# Patient Record
Sex: MALE | Race: WHITE | URBAN - METROPOLITAN AREA
[De-identification: names, ages, dates, MRNs, and addresses within clinical notes are randomized per-mention and may not be internally consistent; named-entity substitution may affect disease eponyms.]

---

## 2023-03-06 DIAGNOSIS — K21.9 GERD WITHOUT ESOPHAGITIS: Primary | ICD-10-CM

## 2023-03-08 RX ORDER — OMEPRAZOLE 40 MG/1
1 CAPSULE, DELAYED RELEASE ORAL DAILY
COMMUNITY
Start: 2019-11-11 | End: 2023-03-08 | Stop reason: SDUPTHER

## 2023-03-08 RX ORDER — OMEPRAZOLE 40 MG/1
40 CAPSULE, DELAYED RELEASE ORAL DAILY
Qty: 90 CAPSULE | Refills: 0 | Status: SHIPPED | OUTPATIENT
Start: 2023-03-08 | End: 2023-05-24

## 2023-03-08 NOTE — TELEPHONE ENCOUNTER
Requested Prescriptions     Pending Prescriptions Disp Refills    omeprazole (PriLOSEC) 40 mg DR capsule 30 capsule 0     Sig: Take 1 capsule (40 mg) by mouth once daily.

## 2023-03-22 DIAGNOSIS — S33.9XXA SPRAIN OF LIGAMENT OF LUMBOSACRAL JOINT, INITIAL ENCOUNTER: Primary | ICD-10-CM

## 2023-03-22 RX ORDER — CYCLOBENZAPRINE HCL 10 MG
10 TABLET ORAL 3 TIMES DAILY PRN
COMMUNITY
Start: 2013-03-15 | End: 2023-03-22 | Stop reason: SDUPTHER

## 2023-03-22 RX ORDER — CYCLOBENZAPRINE HCL 10 MG
10 TABLET ORAL 3 TIMES DAILY PRN
Qty: 90 TABLET | Refills: 1 | Status: SHIPPED | OUTPATIENT
Start: 2023-03-22 | End: 2023-05-21

## 2023-03-22 NOTE — TELEPHONE ENCOUNTER
Requested Prescriptions     Pending Prescriptions Disp Refills    cyclobenzaprine (Flexeril) 10 mg tablet       Sig: Take 1 tablet (10 mg) by mouth 3 times a day as needed for muscle spasms.

## 2023-05-02 ENCOUNTER — OFFICE VISIT (OUTPATIENT)
Dept: PRIMARY CARE | Facility: CLINIC | Age: 53
End: 2023-05-02
Payer: COMMERCIAL

## 2023-05-02 ENCOUNTER — LAB (OUTPATIENT)
Dept: LAB | Facility: LAB | Age: 53
End: 2023-05-02
Payer: COMMERCIAL

## 2023-05-02 VITALS
HEART RATE: 63 BPM | BODY MASS INDEX: 28 KG/M2 | SYSTOLIC BLOOD PRESSURE: 148 MMHG | WEIGHT: 224 LBS | TEMPERATURE: 98.4 F | RESPIRATION RATE: 18 BRPM | OXYGEN SATURATION: 97 % | DIASTOLIC BLOOD PRESSURE: 91 MMHG

## 2023-05-02 DIAGNOSIS — N40.0 BENIGN PROSTATIC HYPERPLASIA, UNSPECIFIED WHETHER LOWER URINARY TRACT SYMPTOMS PRESENT: ICD-10-CM

## 2023-05-02 DIAGNOSIS — E53.8 VITAMIN B12 DEFICIENCY: ICD-10-CM

## 2023-05-02 DIAGNOSIS — E55.9 VITAMIN D DEFICIENCY: ICD-10-CM

## 2023-05-02 DIAGNOSIS — Z00.00 ENCOUNTER FOR HEALTH MAINTENANCE EXAMINATION: ICD-10-CM

## 2023-05-02 DIAGNOSIS — K58.9 IRRITABLE BOWEL SYNDROME, UNSPECIFIED TYPE: ICD-10-CM

## 2023-05-02 DIAGNOSIS — G89.4 CHRONIC PAIN SYNDROME: ICD-10-CM

## 2023-05-02 DIAGNOSIS — F41.1 GENERALIZED ANXIETY DISORDER: ICD-10-CM

## 2023-05-02 DIAGNOSIS — I10 HYPERTENSION, UNSPECIFIED TYPE: ICD-10-CM

## 2023-05-02 DIAGNOSIS — K21.9 GASTROESOPHAGEAL REFLUX DISEASE WITHOUT ESOPHAGITIS: ICD-10-CM

## 2023-05-02 DIAGNOSIS — Z00.00 ENCOUNTER FOR HEALTH MAINTENANCE EXAMINATION: Primary | ICD-10-CM

## 2023-05-02 DIAGNOSIS — M51.37 DISC DEGENERATION, LUMBOSACRAL: ICD-10-CM

## 2023-05-02 DIAGNOSIS — E78.5 HYPERLIPIDEMIA, UNSPECIFIED HYPERLIPIDEMIA TYPE: ICD-10-CM

## 2023-05-02 DIAGNOSIS — K64.9 HEMORRHOIDS, UNSPECIFIED HEMORRHOID TYPE: ICD-10-CM

## 2023-05-02 PROBLEM — M54.9 BACK PAIN WITH RADIATION: Status: ACTIVE | Noted: 2023-05-02

## 2023-05-02 PROBLEM — J30.9 ALLERGIC RHINITIS: Status: ACTIVE | Noted: 2023-05-02

## 2023-05-02 PROBLEM — G89.29 CHRONIC PAIN: Status: ACTIVE | Noted: 2021-01-06

## 2023-05-02 PROBLEM — M51.379 DISC DEGENERATION, LUMBOSACRAL: Status: ACTIVE | Noted: 2023-05-02

## 2023-05-02 LAB
ALANINE AMINOTRANSFERASE (SGPT) (U/L) IN SER/PLAS: 26 U/L (ref 10–52)
ALBUMIN (G/DL) IN SER/PLAS: 4.9 G/DL (ref 3.4–5)
ALKALINE PHOSPHATASE (U/L) IN SER/PLAS: 75 U/L (ref 33–120)
ANION GAP IN SER/PLAS: 16 MMOL/L (ref 10–20)
ASPARTATE AMINOTRANSFERASE (SGOT) (U/L) IN SER/PLAS: 24 U/L (ref 9–39)
BILIRUBIN TOTAL (MG/DL) IN SER/PLAS: 0.8 MG/DL (ref 0–1.2)
CALCIDIOL (25 OH VITAMIN D3) (NG/ML) IN SER/PLAS: 19 NG/ML
CALCIUM (MG/DL) IN SER/PLAS: 9.9 MG/DL (ref 8.6–10.3)
CARBON DIOXIDE, TOTAL (MMOL/L) IN SER/PLAS: 27 MMOL/L (ref 21–32)
CHLORIDE (MMOL/L) IN SER/PLAS: 102 MMOL/L (ref 98–107)
CHOLESTEROL (MG/DL) IN SER/PLAS: 192 MG/DL (ref 0–199)
CHOLESTEROL IN HDL (MG/DL) IN SER/PLAS: 50.3 MG/DL
CHOLESTEROL/HDL RATIO: 3.8
COBALAMIN (VITAMIN B12) (PG/ML) IN SER/PLAS: 236 PG/ML (ref 211–911)
CREATININE (MG/DL) IN SER/PLAS: 0.93 MG/DL (ref 0.5–1.3)
ERYTHROCYTE DISTRIBUTION WIDTH (RATIO) BY AUTOMATED COUNT: 12.7 % (ref 11.5–14.5)
ERYTHROCYTE MEAN CORPUSCULAR HEMOGLOBIN CONCENTRATION (G/DL) BY AUTOMATED: 34.4 G/DL (ref 32–36)
ERYTHROCYTE MEAN CORPUSCULAR VOLUME (FL) BY AUTOMATED COUNT: 86 FL (ref 80–100)
ERYTHROCYTES (10*6/UL) IN BLOOD BY AUTOMATED COUNT: 5.17 X10E12/L (ref 4.5–5.9)
ESTIMATED AVERAGE GLUCOSE FOR HBA1C: 120 MG/DL
GFR MALE: >90 ML/MIN/1.73M2
GLUCOSE (MG/DL) IN SER/PLAS: 118 MG/DL (ref 74–99)
HEMATOCRIT (%) IN BLOOD BY AUTOMATED COUNT: 44.5 % (ref 41–52)
HEMOGLOBIN (G/DL) IN BLOOD: 15.3 G/DL (ref 13.5–17.5)
HEMOGLOBIN A1C/HEMOGLOBIN TOTAL IN BLOOD: 5.8 %
LDL: 90 MG/DL (ref 0–99)
LEUKOCYTES (10*3/UL) IN BLOOD BY AUTOMATED COUNT: 5.1 X10E9/L (ref 4.4–11.3)
NON HDL CHOLESTEROL: 142 MG/DL
PLATELETS (10*3/UL) IN BLOOD AUTOMATED COUNT: 201 X10E9/L (ref 150–450)
POTASSIUM (MMOL/L) IN SER/PLAS: 4.6 MMOL/L (ref 3.5–5.3)
PROSTATE SPECIFIC AG (NG/ML) IN SER/PLAS: 0.15 NG/ML (ref 0–4)
PROTEIN TOTAL: 7.5 G/DL (ref 6.4–8.2)
SODIUM (MMOL/L) IN SER/PLAS: 140 MMOL/L (ref 136–145)
THYROTROPIN (MIU/L) IN SER/PLAS BY DETECTION LIMIT <= 0.05 MIU/L: 0.92 MIU/L (ref 0.44–3.98)
TRIGLYCERIDE (MG/DL) IN SER/PLAS: 259 MG/DL (ref 0–149)
UREA NITROGEN (MG/DL) IN SER/PLAS: 12 MG/DL (ref 6–23)
VLDL: 52 MG/DL (ref 0–40)

## 2023-05-02 PROCEDURE — 84443 ASSAY THYROID STIM HORMONE: CPT

## 2023-05-02 PROCEDURE — 80061 LIPID PANEL: CPT

## 2023-05-02 PROCEDURE — 36415 COLL VENOUS BLD VENIPUNCTURE: CPT

## 2023-05-02 PROCEDURE — 85027 COMPLETE CBC AUTOMATED: CPT

## 2023-05-02 PROCEDURE — 82306 VITAMIN D 25 HYDROXY: CPT

## 2023-05-02 PROCEDURE — 82607 VITAMIN B-12: CPT

## 2023-05-02 PROCEDURE — 99396 PREV VISIT EST AGE 40-64: CPT | Performed by: FAMILY MEDICINE

## 2023-05-02 PROCEDURE — 80053 COMPREHEN METABOLIC PANEL: CPT

## 2023-05-02 PROCEDURE — 83036 HEMOGLOBIN GLYCOSYLATED A1C: CPT

## 2023-05-02 PROCEDURE — 84153 ASSAY OF PSA TOTAL: CPT

## 2023-05-02 RX ORDER — METOPROLOL TARTRATE 25 MG/1
TABLET, FILM COATED ORAL
COMMUNITY
Start: 2018-07-09 | End: 2023-05-02 | Stop reason: DRUGHIGH

## 2023-05-02 RX ORDER — ROSUVASTATIN CALCIUM 10 MG/1
TABLET, COATED ORAL
COMMUNITY
Start: 2017-03-17

## 2023-05-02 RX ORDER — TRAMADOL HYDROCHLORIDE 100 MG/1
TABLET, COATED ORAL
COMMUNITY

## 2023-05-02 RX ORDER — IBUPROFEN 800 MG/1
TABLET ORAL
COMMUNITY
Start: 2020-05-29

## 2023-05-02 RX ORDER — METOPROLOL TARTRATE 25 MG/1
12.5 TABLET, FILM COATED ORAL DAILY PRN
Qty: 30 TABLET | Refills: 0 | Status: SHIPPED | OUTPATIENT
Start: 2023-05-02

## 2023-05-02 RX ORDER — METOPROLOL SUCCINATE 50 MG/1
TABLET, EXTENDED RELEASE ORAL
COMMUNITY
Start: 2020-12-30

## 2023-05-02 ASSESSMENT — PATIENT HEALTH QUESTIONNAIRE - PHQ9
1. LITTLE INTEREST OR PLEASURE IN DOING THINGS: NOT AT ALL
SUM OF ALL RESPONSES TO PHQ9 QUESTIONS 1 AND 2: 0
2. FEELING DOWN, DEPRESSED OR HOPELESS: NOT AT ALL

## 2023-05-02 ASSESSMENT — ENCOUNTER SYMPTOMS
DEPRESSION: 0
LOSS OF SENSATION IN FEET: 0
OCCASIONAL FEELINGS OF UNSTEADINESS: 0

## 2023-05-02 NOTE — PROGRESS NOTES
"Subjective   Patient ID: Jesus Jackson is a 52 y.o. male who presents for Request For Order(s) (Lipid panel, is fasting today. Medical records request for insurance ).    HPI   Patient comes in today for complete physical exam.  He is finishing up allergy shots with his allergist and he believes its is helping.  He is not sure what they will be doing for maintenance.    His back pain right now is chronic and averages 5 out of 10 pain scale.  He did have an RF ablation about 17 months ago which is still effective.  We will continue to monitor.    His cardiac arrhythmia is under control with use of metoprolol tartrate half a tablet daily as needed per Dr. Hanks his cardiologist.        5/26/2022  Patient comes in today to discuss his allergies. He states he has never had them this bad. His eyes are almost swollen shut. He has been trying Zyrtec without relief. He has an appointment with an allergist but not until the end of June.    3/15/2022  Patient presents today for 6-month checkup and refill of meds. He states that he is taking his medicines as directed and is not having any side effects from them. He is currently here not only for 6-month checkup but also because he is having problems with an irritated throat. He states that he irritated throat has been going on actually for several months but his daughter Pushpa was just home for spring break and was diagnosed with mono.    6/10/2021  Patient presents today for 6-month checkup and refill of meds. He currently is without complaints. He states that he is taking his medicines as directed and is not having any side effects from them.    12/8/2020  Patient comes in today for physical exam. He is currently without complaints. He is taking his medicines as directed and is tolerating them well.    11/10/2020  Patient comes in today for follow-up on the bilateral \"neck lumps\". Patient has a fullness at the base of the left side of his neck which is similar on " the right and is nontender, noninflamed and otherwise does not bother the patient. He is concerned because when he has a shirt off and looks in the mirror there seems to be a difference in the 2 sides. Patient is highly anxious.    10/7/2020  Patient comes in today for evaluation of upper respiratory symptoms. He and his family just recently moved to an old farmhouse in [place] and not only are his wife and daughter with him but also his mother-in-law. The house is apparently over 100 years old and and needs work but he is looking forward to it. He has noticed he is clearing his throat a lot and has some phlegm but denies shortness of breath. He also had a basal cell carcinoma removed from his posterior right neck by Dr. Nicolas on 9/22/2020 and that his stitches out on 10/1/2020. The wound appears to be healing without sign of infection currently. He has however noticed some puffiness of the base of his neck bilaterally. He mentioned this to Dr. Nicolas who told him that is not uncommon after neck surgery and it should dissipate. Patient suffers from anxiety and this has him worried.    5/29/2020  Patient presents today for 3 different issues.   First of all he is complaining of allergy type symptoms with signage drainage and postnasal drip. He denies cough, fever or shortness of breath. He denies any exposure to coronavirus. He states he typically gets allergies this time of year.    #2 patient states he was exercising with dumbbells back in December and noticed pain in the left deltoid region. He continues to have problems today. He has problems with abduction and external rotation especially causing pain.    #3 patient has a fleshy non-pigmented mole on the posterior right neck that is aggravated by clothing and he would like to have it removed.    2/20/2020  Patient comes in today for follow-up on his IBS. He is very happy because it is gone away. He said it took about 3 or 4 days on the align to make it happen but  now it is doing well and he is down to having no more than 2 bowel movements a day. He is no longer having the abdominal pain and bloating.    Patient also experiencing some itching and irritation to the medial corner of the left eye. This has been occurring over the last few days. He does have a history of corneal problems with the left eye. He denies any trauma or injury to the eye. He denies any new chemicals or irritants that may have gotten into the eye. I don't see any obvious conjunctivitis but because of his corneal issues with the eye and ongoing regular eye treatments I have recommended that he follow-up with his ophthalmologist to see what their diagnosis is.    1/30/2020  Jesus is here for stomach issues, pain,bloating, frequent trips to the bathroom. certain foods bother his stomach. He has a real urge to have a bowel movement after eating. A lot of this pain is on the right side of his abdomen. His nausea is random. He did have a colonoscopy 11/14/17 by Dr. Russo which showed a normal colon and terminal ileum with external and internal hemorrhoids. He just had a physical last month which still show the hemorrhoids but was negative for blood.    12/5/19  The patient's health since the last visit is described as good. There are no interval changes in the patient's social and family history. he has regular dental visits. he complains of vision problems. Vision care includes wearing glasses. he denies hearing loss. Immunizations status: up to date.   Lifestyle: he consumes a diverse and healthy diet. he does not have any weight concerns. he does not exercise regularly. he does not use tobacco. he denies alcohol use.   Reproductive health: the patient is sexually active. birth control is not being practiced. He denies erectile dysfunction.   Metabolic screening: lipid profile performed within the past five years.   Patient comes in today for well physical exam. He currently is without complaints. His  "daughter was just accepted into [employer] where she hopes to study forensic medicine.    5/25/2019  The patient is a 48 year old male who presents with a complaint of lump(s). The lump(s) has been occurring for 2 weeks. The location of the lump(s) is the neck (right side). Note for \"Lump(s)\": Patient comes in today for evaluation of a skin lesion on the posterior right base of his neck. He has a history of skin cancers in his family and wanted to have this checked out.    Additional reasons for visit:    Cold symptoms is described as the following:  Symptoms include runny nose, nasal congestion, sore throat, headache, cough and sneezing. The episodes last for 4 weeks. The patient is not currently being treated for this problem. Note for \"Cold symptoms\": Patient also comes in today complaining of symptoms as noted above. He states he's been having the cold symptoms since Easter. In further questioning he does feel like he may have spring allergies. He does notice that his symptoms have been worse since he has seen the \"yellow-green dust\" on his car in the morning.     Review of Systems   All other systems reviewed and are negative.      Objective   BP (!) 148/91   Pulse 63   Temp 36.9 °C (98.4 °F)   Resp 18   Wt 102 kg (224 lb)   SpO2 97%   BMI 28.00 kg/m²     Physical Exam  Vitals reviewed.   Constitutional:       Appearance: He is well-developed.   HENT:      Head: Normocephalic and atraumatic.      Right Ear: Tympanic membrane, ear canal and external ear normal.      Left Ear: Tympanic membrane, ear canal and external ear normal.      Nose: Nose normal.      Mouth/Throat:      Mouth: Mucous membranes are moist.      Pharynx: Oropharynx is clear.   Eyes:      Extraocular Movements: Extraocular movements intact.      Conjunctiva/sclera: Conjunctivae normal.      Pupils: Pupils are equal, round, and reactive to light.   Cardiovascular:      Rate and Rhythm: Normal rate and regular rhythm.      Heart " sounds: Normal heart sounds. No murmur heard.  Pulmonary:      Effort: Pulmonary effort is normal.      Breath sounds: Normal breath sounds. No wheezing.   Abdominal:      General: Abdomen is flat. Bowel sounds are normal.      Palpations: Abdomen is soft.   Genitourinary:     Comments: External hemorrhoids present  Musculoskeletal:         General: Tenderness (Chronic low back pain) present. Normal range of motion.   Skin:     General: Skin is warm and dry.   Neurological:      General: No focal deficit present.      Mental Status: He is alert and oriented to person, place, and time. Mental status is at baseline.   Psychiatric:         Mood and Affect: Mood normal.         Behavior: Behavior normal.         Thought Content: Thought content normal.         Judgment: Judgment normal.         Assessment/Plan   Problem List Items Addressed This Visit       Chronic pain    Disc degeneration, lumbosacral    Gastroesophageal reflux disease    Generalized anxiety disorder    Hyperlipidemia    Relevant Orders    Lipid Panel    Hypertension    Irritable bowel syndrome    Vitamin D deficiency    Relevant Orders    Vitamin D, Total    Vitamin B12 deficiency    Relevant Orders    CBC    Vitamin B12    Hemorrhoids    Encounter for health maintenance examination - Primary    Relevant Orders    Comprehensive Metabolic Panel    TSH with reflex to Free T4 if abnormal    Hemoglobin A1C     Other Visit Diagnoses       Benign prostatic hyperplasia, unspecified whether lower urinary tract symptoms present        Relevant Orders    Prostate Specific Antigen        Will contact patient with lab results when available.  Continue follow-up with allergy, pain management and cardiology as needed.  Continue current meds as directed.  Follow up in 6 months for recheck if all remains stable, sooner if problems arise.  Medication list reconciled.  Thank you for visiting today!      Professional services: Some of this note was completed using  Dragon voice technology and sometimes the software misinterprets words. This may include unintended errors with respect to translation of words, typographical errors or grammar errors which may not have been identified prior to finalization of the chart note. Please take this into account when reading the note. Thank you.

## 2023-05-03 ENCOUNTER — TELEPHONE (OUTPATIENT)
Dept: PEDIATRICS | Facility: CLINIC | Age: 53
End: 2023-05-03
Payer: COMMERCIAL

## 2023-05-03 NOTE — TELEPHONE ENCOUNTER
----- Message from Hadley Burgess DO sent at 5/3/2023  7:25 AM EDT -----  Regarding: Labs  Please notify patient of low vitamin D of 19.  It should be between 30 and 100 the closer to 50 the better. It is an important vitamin for your heart, lungs, immune system and bones among other things in your body. Would recommend OTC vitamin D3 2000 units daily to get it into and keep it in the normal range and recheck in September 2023.      Vitamin B12 was also low at 236.  It should be above 400, when less than 400 one may have both neurological and/or psychological symptoms. Would recommend B-12 shots monthly for 4 months and recheck in September 2023. These can be self injected at home or set up in our office as a nurse visit.    All the rest of the labs look good.  Continue current medicines and recheck in 1 year.  ----- Message -----  From: Lab, Background User  Sent: 5/2/2023  12:01 PM EDT  To: Hadley Burgess DO

## 2023-05-22 DIAGNOSIS — K21.9 GERD WITHOUT ESOPHAGITIS: ICD-10-CM

## 2023-05-24 RX ORDER — OMEPRAZOLE 40 MG/1
CAPSULE, DELAYED RELEASE ORAL
Qty: 90 CAPSULE | Refills: 1 | Status: SHIPPED | OUTPATIENT
Start: 2023-05-24 | End: 2023-11-20

## 2023-10-12 ENCOUNTER — OFFICE VISIT (OUTPATIENT)
Dept: PRIMARY CARE | Facility: CLINIC | Age: 53
End: 2023-10-12
Payer: COMMERCIAL

## 2023-10-12 VITALS
SYSTOLIC BLOOD PRESSURE: 168 MMHG | WEIGHT: 221 LBS | RESPIRATION RATE: 18 BRPM | OXYGEN SATURATION: 98 % | HEART RATE: 53 BPM | BODY MASS INDEX: 27.62 KG/M2 | TEMPERATURE: 97.7 F | DIASTOLIC BLOOD PRESSURE: 102 MMHG

## 2023-10-12 DIAGNOSIS — F41.1 GENERALIZED ANXIETY DISORDER: ICD-10-CM

## 2023-10-12 DIAGNOSIS — I10 HYPERTENSION, UNSPECIFIED TYPE: Primary | ICD-10-CM

## 2023-10-12 DIAGNOSIS — I47.20 VENTRICULAR TACHYCARDIA (MULTI): ICD-10-CM

## 2023-10-12 PROBLEM — K44.9 HIATAL HERNIA: Status: ACTIVE | Noted: 2021-01-06

## 2023-10-12 PROBLEM — K27.9 PEPTIC ULCER: Status: ACTIVE | Noted: 2021-01-06

## 2023-10-12 PROBLEM — H81.10 VERTIGO, BENIGN POSITIONAL: Status: ACTIVE | Noted: 2023-10-12

## 2023-10-12 PROCEDURE — 99213 OFFICE O/P EST LOW 20 MIN: CPT | Performed by: NURSE PRACTITIONER

## 2023-10-12 RX ORDER — AMLODIPINE BESYLATE 5 MG/1
5 TABLET ORAL DAILY
Qty: 30 TABLET | Refills: 1 | Status: SHIPPED | OUTPATIENT
Start: 2023-10-12 | End: 2024-10-11

## 2023-10-12 ASSESSMENT — ENCOUNTER SYMPTOMS
HYPERTENSION: 1
COUGH: 0
HEADACHES: 1
PALPITATIONS: 1
NECK PAIN: 1
SHORTNESS OF BREATH: 0

## 2023-10-12 NOTE — PROGRESS NOTES
Subjective   Jesus Jackson is a 53 y.o. male who presents for Hypertension (Has been checking at home. Normal around 130s/70-80s, never been this high- 170/100 yesterday. Mild headaches. Sees cardiology ).  Hypertension  This is a new problem. The current episode started more than 1 year ago. The problem has been rapidly worsening since onset. The problem is resistant. Associated symptoms include anxiety, headaches, neck pain and palpitations. Pertinent negatives include no chest pain or shortness of breath. Agents associated with hypertension include NSAIDs. Risk factors for coronary artery disease include dyslipidemia and stress.   Review of Systems   Respiratory:  Negative for cough and shortness of breath.    Cardiovascular:  Positive for palpitations. Negative for chest pain.   Musculoskeletal:  Positive for neck pain.   Neurological:  Positive for headaches.   Objective   Physical Exam  Vitals reviewed.   Constitutional:       Appearance: Normal appearance.   HENT:      Head: Normocephalic.   Eyes:      Conjunctiva/sclera: Conjunctivae normal.   Cardiovascular:      Rate and Rhythm: Normal rate and regular rhythm.      Pulses: Normal pulses.      Heart sounds: Normal heart sounds. No murmur heard.  Pulmonary:      Effort: Pulmonary effort is normal.      Breath sounds: Normal breath sounds.   Abdominal:      General: Bowel sounds are normal.      Palpations: Abdomen is soft.   Musculoskeletal:         General: Normal range of motion.      Cervical back: Neck supple.   Skin:     General: Skin is warm and dry.   Neurological:      General: No focal deficit present.      Mental Status: He is alert and oriented to person, place, and time.   Psychiatric:         Mood and Affect: Mood normal.         Thought Content: Thought content normal.     BP (!) 168/102   Pulse 53   Temp 36.5 °C (97.7 °F)   Resp 18   Wt 100 kg (221 lb)   SpO2 98%   BMI 27.62 kg/m²   Assessment/Plan   Problem List Items Addressed This  Visit       Generalized anxiety disorder    Hypertension - Primary    Relevant Medications    amLODIPine (Norvasc) 5 mg tablet    Ventricular tachycardia (CMS/HCC)     Follows with Dr. Sparrow. Pt. States he has occasional PVCs, but every few years has runs of VT. He does not have an AICD, but was instructed by his cardiologist to use low-dose metoprolol tartrate as needed when symptoms begin.         Relevant Medications    amLODIPine (Norvasc) 5 mg tablet     Follow up with cardiology on Monday as scheduled.  If you develop chest pain, visual disturbance, difficulty speaking, or unilateral weakness, go to ER.

## 2023-10-12 NOTE — ASSESSMENT & PLAN NOTE
Follows with Dr. Sparrow. Pt. States he has occasional PVCs, but every few years has runs of VT. He does not have an AICD, but was instructed by his cardiologist to use low-dose metoprolol tartrate as needed when symptoms begin.

## 2023-10-17 ENCOUNTER — TELEPHONE (OUTPATIENT)
Dept: PRIMARY CARE | Facility: CLINIC | Age: 53
End: 2023-10-17

## 2023-10-17 ENCOUNTER — CLINICAL SUPPORT (OUTPATIENT)
Dept: PRIMARY CARE | Facility: CLINIC | Age: 53
End: 2023-10-17
Payer: COMMERCIAL

## 2023-10-17 DIAGNOSIS — E53.8 VITAMIN B12 DEFICIENCY: ICD-10-CM

## 2023-10-17 PROCEDURE — 96372 THER/PROPH/DIAG INJ SC/IM: CPT | Performed by: FAMILY MEDICINE

## 2023-10-17 RX ORDER — CYANOCOBALAMIN 1000 UG/ML
1000 INJECTION, SOLUTION INTRAMUSCULAR; SUBCUTANEOUS ONCE
Status: COMPLETED | OUTPATIENT
Start: 2023-10-17 | End: 2023-10-17

## 2023-10-17 RX ADMIN — CYANOCOBALAMIN 1000 MCG: 1000 INJECTION, SOLUTION INTRAMUSCULAR; SUBCUTANEOUS at 14:30

## 2023-10-17 NOTE — TELEPHONE ENCOUNTER
That should be fine to have them done on the same day. I don't know about the same site. That's a question that the pharmacist could answer better.

## 2023-10-17 NOTE — TELEPHONE ENCOUNTER
Pt lives in [place] and comes up once a month for allergy shots. He wants to know if he can do his B12 the same day or if its an issues having it injected around the same site the same day. Wants to make sure there is no interactions.

## 2023-11-14 ENCOUNTER — CLINICAL SUPPORT (OUTPATIENT)
Dept: PRIMARY CARE | Facility: CLINIC | Age: 53
End: 2023-11-14
Payer: COMMERCIAL

## 2023-11-14 DIAGNOSIS — E53.8 VITAMIN B12 DEFICIENCY: ICD-10-CM

## 2023-11-14 PROCEDURE — 96372 THER/PROPH/DIAG INJ SC/IM: CPT | Performed by: FAMILY MEDICINE

## 2023-11-14 RX ORDER — CYANOCOBALAMIN 1000 UG/ML
1000 INJECTION, SOLUTION INTRAMUSCULAR; SUBCUTANEOUS
Status: COMPLETED | OUTPATIENT
Start: 2023-11-14 | End: 2024-01-09

## 2023-11-14 RX ADMIN — CYANOCOBALAMIN 1000 MCG: 1000 INJECTION, SOLUTION INTRAMUSCULAR; SUBCUTANEOUS at 13:50

## 2023-11-14 NOTE — PROGRESS NOTES
Jesus Jackson presented in office for 2/4 b12  nurse visit. Pt tolerated well, no side effects. Overseeing provider was Dr. Dela Cruz.

## 2023-11-20 DIAGNOSIS — K21.9 GERD WITHOUT ESOPHAGITIS: ICD-10-CM

## 2023-11-20 RX ORDER — OMEPRAZOLE 40 MG/1
40 CAPSULE, DELAYED RELEASE ORAL DAILY
Qty: 90 CAPSULE | Refills: 1 | Status: SHIPPED | OUTPATIENT
Start: 2023-11-20 | End: 2023-12-05 | Stop reason: SDUPTHER

## 2023-11-20 NOTE — TELEPHONE ENCOUNTER
Requested Prescriptions     Pending Prescriptions Disp Refills    omeprazole (PriLOSEC) 40 mg DR capsule [Pharmacy Med Name: OMEPRAZOLE DR CAPS 40MG] 90 capsule 1     Sig: Take 1 capsule (40 mg) by mouth once daily.

## 2023-12-05 ENCOUNTER — TELEPHONE (OUTPATIENT)
Dept: PRIMARY CARE | Facility: CLINIC | Age: 53
End: 2023-12-05
Payer: COMMERCIAL

## 2023-12-05 DIAGNOSIS — K21.9 GERD WITHOUT ESOPHAGITIS: ICD-10-CM

## 2023-12-05 DIAGNOSIS — M51.37 DISC DEGENERATION, LUMBOSACRAL: ICD-10-CM

## 2023-12-05 DIAGNOSIS — M54.9 BACK PAIN WITH RADIATION: ICD-10-CM

## 2023-12-05 RX ORDER — OMEPRAZOLE 40 MG/1
40 CAPSULE, DELAYED RELEASE ORAL DAILY
Qty: 90 CAPSULE | Refills: 1 | Status: CANCELLED | OUTPATIENT
Start: 2023-12-05 | End: 2024-06-02

## 2023-12-05 RX ORDER — OMEPRAZOLE 40 MG/1
40 CAPSULE, DELAYED RELEASE ORAL DAILY
Qty: 90 CAPSULE | Refills: 1 | Status: SHIPPED | OUTPATIENT
Start: 2023-12-05 | End: 2024-06-02

## 2023-12-05 RX ORDER — TRAMADOL HYDROCHLORIDE 100 MG/1
100 TABLET, COATED ORAL DAILY
Qty: 60 TABLET | Refills: 0 | Status: CANCELLED | OUTPATIENT
Start: 2023-12-05

## 2023-12-05 RX ORDER — OMEPRAZOLE 40 MG/1
40 CAPSULE, DELAYED RELEASE ORAL DAILY
Qty: 15 CAPSULE | Refills: 0 | Status: SHIPPED | OUTPATIENT
Start: 2023-12-05 | End: 2024-06-07 | Stop reason: ALTCHOICE

## 2023-12-05 NOTE — TELEPHONE ENCOUNTER
Notify patient that the Prilosec was sent to the pharmacy.  Tramadol was not sent as it is a narcotic medication which I have never prescribed for him.

## 2023-12-05 NOTE — TELEPHONE ENCOUNTER
Spoke with patient. Informed him that Prilosec has been sent, and that Dr. Burgess could not send in Tramadol without seeing the patient. Patient stated he forgot which DrNatasha Prescribed the medication and will contact the prescribing DrNatasha

## 2023-12-05 NOTE — TELEPHONE ENCOUNTER
Patient is requesting a refill on his       Omeprazole 40 mg (Sent to [facility] as well as [facility] he will not have enough to last till gets his medication from [email or web address].)    Tramadol 100 mg (Sent to [facility] only)      Thank You       Patient last seen 10/12/23

## 2023-12-05 NOTE — TELEPHONE ENCOUNTER
Patient is asking if the Omeprazole can be sent to [facility] and [facility], as patient will not receive [facility] order in time.       Patient requests prescription below    Last Office Visit: 10/12/2023     Requested Prescriptions     Pending Prescriptions Disp Refills    traMADol (Ultram) 100 mg tablet 60 tablet 0     Sig: Take 1 tablet (100 mg) by mouth once daily.    omeprazole (PriLOSEC) 40 mg DR capsule 90 capsule 1     Sig: Take 1 capsule (40 mg) by mouth once daily.

## 2023-12-06 ENCOUNTER — TELEPHONE (OUTPATIENT)
Dept: PAIN MEDICINE | Facility: CLINIC | Age: 53
End: 2023-12-06
Payer: COMMERCIAL

## 2023-12-06 NOTE — TELEPHONE ENCOUNTER
Called in requesting refill of tramadol  his last visit was May 2023  did let him know with the tramadol he needs to be seen every 90 days  he stated he is going out of town and will call when he needs it.

## 2023-12-12 ENCOUNTER — CLINICAL SUPPORT (OUTPATIENT)
Dept: PRIMARY CARE | Facility: CLINIC | Age: 53
End: 2023-12-12
Payer: COMMERCIAL

## 2023-12-12 DIAGNOSIS — E53.8 VITAMIN B12 DEFICIENCY: Primary | ICD-10-CM

## 2023-12-12 PROCEDURE — 96372 THER/PROPH/DIAG INJ SC/IM: CPT | Performed by: FAMILY MEDICINE

## 2023-12-12 RX ADMIN — CYANOCOBALAMIN 1000 MCG: 1000 INJECTION, SOLUTION INTRAMUSCULAR; SUBCUTANEOUS at 10:26

## 2023-12-12 NOTE — PROGRESS NOTES
Jesus Jackson presented in office for 3/4 b12 inj nurse visit. Pt tolerated well, no side effects. Overseeing provider was Dr. Burgess  .

## 2024-01-09 ENCOUNTER — CLINICAL SUPPORT (OUTPATIENT)
Dept: PRIMARY CARE | Facility: CLINIC | Age: 54
End: 2024-01-09
Payer: COMMERCIAL

## 2024-01-09 DIAGNOSIS — E53.8 VITAMIN B12 DEFICIENCY: ICD-10-CM

## 2024-01-09 DIAGNOSIS — E55.9 VITAMIN D DEFICIENCY: ICD-10-CM

## 2024-01-09 PROCEDURE — 96372 THER/PROPH/DIAG INJ SC/IM: CPT | Performed by: FAMILY MEDICINE

## 2024-01-09 RX ADMIN — CYANOCOBALAMIN 1000 MCG: 1000 INJECTION, SOLUTION INTRAMUSCULAR; SUBCUTANEOUS at 10:41

## 2024-01-09 NOTE — PROGRESS NOTES
Jesus Jackson presented in office for 4/4 b12 inj nurse visit. Pt tolerated well, no side effects. Overseeing provider was Dr. Burgess.    Labs ordered and Pt advised.

## 2024-02-08 ENCOUNTER — LAB (OUTPATIENT)
Dept: LAB | Facility: LAB | Age: 54
End: 2024-02-08
Payer: COMMERCIAL

## 2024-02-08 DIAGNOSIS — E53.8 VITAMIN B12 DEFICIENCY: ICD-10-CM

## 2024-02-08 DIAGNOSIS — E55.9 VITAMIN D DEFICIENCY: ICD-10-CM

## 2024-02-08 LAB
25(OH)D3 SERPL-MCNC: 28 NG/ML (ref 30–100)
VIT B12 SERPL-MCNC: 426 PG/ML (ref 211–911)

## 2024-02-08 PROCEDURE — 36415 COLL VENOUS BLD VENIPUNCTURE: CPT

## 2024-02-08 PROCEDURE — 82306 VITAMIN D 25 HYDROXY: CPT

## 2024-02-08 PROCEDURE — 82607 VITAMIN B-12: CPT

## 2024-06-07 ENCOUNTER — OFFICE VISIT (OUTPATIENT)
Dept: PRIMARY CARE | Facility: CLINIC | Age: 54
End: 2024-06-07
Payer: COMMERCIAL

## 2024-06-07 ENCOUNTER — LAB (OUTPATIENT)
Dept: LAB | Facility: LAB | Age: 54
End: 2024-06-07
Payer: COMMERCIAL

## 2024-06-07 VITALS
SYSTOLIC BLOOD PRESSURE: 166 MMHG | RESPIRATION RATE: 20 BRPM | WEIGHT: 220 LBS | BODY MASS INDEX: 27.5 KG/M2 | DIASTOLIC BLOOD PRESSURE: 90 MMHG | HEART RATE: 54 BPM | OXYGEN SATURATION: 98 % | TEMPERATURE: 97.5 F

## 2024-06-07 DIAGNOSIS — E78.5 HYPERLIPIDEMIA, UNSPECIFIED HYPERLIPIDEMIA TYPE: ICD-10-CM

## 2024-06-07 DIAGNOSIS — I10 HYPERTENSION, UNSPECIFIED TYPE: ICD-10-CM

## 2024-06-07 DIAGNOSIS — N40.0 BENIGN PROSTATIC HYPERPLASIA, UNSPECIFIED WHETHER LOWER URINARY TRACT SYMPTOMS PRESENT: ICD-10-CM

## 2024-06-07 DIAGNOSIS — E53.8 VITAMIN B12 DEFICIENCY: ICD-10-CM

## 2024-06-07 DIAGNOSIS — E55.9 VITAMIN D DEFICIENCY: ICD-10-CM

## 2024-06-07 DIAGNOSIS — E53.8 VITAMIN B12 DEFICIENCY: Primary | ICD-10-CM

## 2024-06-07 DIAGNOSIS — B35.1 ONYCHOMYCOSIS: ICD-10-CM

## 2024-06-07 LAB
25(OH)D3 SERPL-MCNC: 39 NG/ML (ref 30–100)
ALBUMIN SERPL BCP-MCNC: 4.9 G/DL (ref 3.4–5)
ALP SERPL-CCNC: 83 U/L (ref 33–120)
ALT SERPL W P-5'-P-CCNC: 22 U/L (ref 10–52)
ANION GAP SERPL CALC-SCNC: 14 MMOL/L (ref 10–20)
AST SERPL W P-5'-P-CCNC: 23 U/L (ref 9–39)
BILIRUB SERPL-MCNC: 0.9 MG/DL (ref 0–1.2)
BUN SERPL-MCNC: 16 MG/DL (ref 6–23)
CALCIUM SERPL-MCNC: 9.8 MG/DL (ref 8.6–10.3)
CHLORIDE SERPL-SCNC: 106 MMOL/L (ref 98–107)
CHOLEST SERPL-MCNC: 147 MG/DL (ref 0–199)
CHOLESTEROL/HDL RATIO: 3
CO2 SERPL-SCNC: 27 MMOL/L (ref 21–32)
CREAT SERPL-MCNC: 1.03 MG/DL (ref 0.5–1.3)
EGFRCR SERPLBLD CKD-EPI 2021: 86 ML/MIN/1.73M*2
ERYTHROCYTE [DISTWIDTH] IN BLOOD BY AUTOMATED COUNT: 13 % (ref 11.5–14.5)
GLUCOSE SERPL-MCNC: 105 MG/DL (ref 74–99)
HCT VFR BLD AUTO: 42.1 % (ref 41–52)
HDLC SERPL-MCNC: 49.3 MG/DL
HGB BLD-MCNC: 14.5 G/DL (ref 13.5–17.5)
LDLC SERPL CALC-MCNC: 84 MG/DL
MCH RBC QN AUTO: 29.3 PG (ref 26–34)
MCHC RBC AUTO-ENTMCNC: 34.4 G/DL (ref 32–36)
MCV RBC AUTO: 85 FL (ref 80–100)
NON HDL CHOLESTEROL: 98 MG/DL (ref 0–149)
NRBC BLD-RTO: 0 /100 WBCS (ref 0–0)
PLATELET # BLD AUTO: 238 X10*3/UL (ref 150–450)
POTASSIUM SERPL-SCNC: 5 MMOL/L (ref 3.5–5.3)
PROT SERPL-MCNC: 7.3 G/DL (ref 6.4–8.2)
PSA SERPL-MCNC: 0.27 NG/ML
RBC # BLD AUTO: 4.95 X10*6/UL (ref 4.5–5.9)
SODIUM SERPL-SCNC: 142 MMOL/L (ref 136–145)
TRIGL SERPL-MCNC: 69 MG/DL (ref 0–149)
VIT B12 SERPL-MCNC: 508 PG/ML (ref 211–911)
VLDL: 14 MG/DL (ref 0–40)
WBC # BLD AUTO: 4.8 X10*3/UL (ref 4.4–11.3)

## 2024-06-07 PROCEDURE — 84153 ASSAY OF PSA TOTAL: CPT

## 2024-06-07 PROCEDURE — 82607 VITAMIN B-12: CPT

## 2024-06-07 PROCEDURE — 80053 COMPREHEN METABOLIC PANEL: CPT

## 2024-06-07 PROCEDURE — 36415 COLL VENOUS BLD VENIPUNCTURE: CPT

## 2024-06-07 PROCEDURE — 85027 COMPLETE CBC AUTOMATED: CPT

## 2024-06-07 PROCEDURE — 82306 VITAMIN D 25 HYDROXY: CPT

## 2024-06-07 PROCEDURE — 99214 OFFICE O/P EST MOD 30 MIN: CPT | Performed by: FAMILY MEDICINE

## 2024-06-07 PROCEDURE — 80061 LIPID PANEL: CPT

## 2024-06-07 RX ORDER — TERBINAFINE HYDROCHLORIDE 250 MG/1
250 TABLET ORAL DAILY
Qty: 90 TABLET | Refills: 0 | Status: SHIPPED | OUTPATIENT
Start: 2024-06-07 | End: 2024-09-05

## 2024-06-07 NOTE — PROGRESS NOTES
Subjective   Patient ID: Jesus Jackson is a 53 y.o. male who presents for Follow-up (Cardiology suggested seeing PCP about planning to get off omeprazole, cardiologist concerned about it because it is not good for the stomach lining. ).  HPI  Patient comes in today for checkup and refill of medicine.  He is here mostly because his cardiologist Dr. Hanks whom he sees for history of V. tach suggested that he may want to try getting off of the omeprazole medication due to potential side effects.  Patient is reluctant however because of his significant GERD.    Review of Systems   All other systems reviewed and are negative.      Objective   Vitals:  /90   Pulse 54   Temp 36.4 °C (97.5 °F)   Resp 20   Wt 99.8 kg (220 lb)   SpO2 98%   BMI 27.50 kg/m²     Physical Exam  Vitals reviewed.   Constitutional:       Appearance: He is well-developed.   HENT:      Head: Normocephalic and atraumatic.      Right Ear: Tympanic membrane, ear canal and external ear normal.      Left Ear: Tympanic membrane, ear canal and external ear normal.      Nose: Nose normal.      Mouth/Throat:      Mouth: Mucous membranes are moist.      Pharynx: Oropharynx is clear.   Eyes:      Extraocular Movements: Extraocular movements intact.      Conjunctiva/sclera: Conjunctivae normal.      Pupils: Pupils are equal, round, and reactive to light.   Cardiovascular:      Rate and Rhythm: Normal rate and regular rhythm.      Heart sounds: Normal heart sounds. No murmur heard.  Pulmonary:      Effort: Pulmonary effort is normal.      Breath sounds: Normal breath sounds. No wheezing.   Abdominal:      General: Abdomen is flat. Bowel sounds are normal.      Palpations: Abdomen is soft.   Musculoskeletal:         General: Normal range of motion.   Skin:     General: Skin is warm and dry.   Neurological:      General: No focal deficit present.      Mental Status: He is alert and oriented to person, place, and time. Mental status is at  baseline.   Psychiatric:         Mood and Affect: Mood normal.         Behavior: Behavior normal.         Thought Content: Thought content normal.         Judgment: Judgment normal.         Assessment/Plan   Problem List Items Addressed This Visit       Hyperlipidemia    Relevant Orders    Lipid Panel    Hypertension    Relevant Orders    Comprehensive Metabolic Panel    Vitamin D deficiency    Relevant Orders    Vitamin D 25-Hydroxy,Total (for eval of Vitamin D levels)    Vitamin B12 deficiency - Primary    Relevant Orders    CBC    Vitamin B12     Other Visit Diagnoses       Benign prostatic hyperplasia, unspecified whether lower urinary tract symptoms present        Relevant Orders    Prostate Specific Antigen    Onychomycosis        Relevant Medications    terbinafine (LamISIL) 250 mg tablet        We discussed at length the potential problems with PPIs.  We discussed safety concerns with the use of proton pump inhibitors including the increased reported risk of fractures, pneumonia, C. difficile infections, concerns about the malabsorption of vitamin B-12, calcium and magnesium.    Patient is going to try and take the medicine every other day or as needed and if that is not possible he will try cutting it to 20 mg and see how that works.    Will contact patient with lab results when available.  Take new medication as directed.  Continue other medications as directed.  RTC in one month for recheck, sooner should problems arise.  Medication list reconciled.  Thank you for visiting today!      Professional services: Some of this note was completed using Dragon voice technology and sometimes the software misinterprets words. This may include unintended errors with respect to translation of words, typographical errors or grammar errors which may not have been identified prior to finalization of the chart note. Please take this into account when reading the note. Thank you.               Hadley Burgess, DO 06/07/24  1:57 PM

## 2024-06-10 NOTE — RESULT ENCOUNTER NOTE
Sukhdev Lee,    Your labs all look very good!  Continue current medicines and will recheck again in a year.    Have a Great Day!!!    Dr. Burgess

## 2024-08-16 DIAGNOSIS — K21.9 GERD WITHOUT ESOPHAGITIS: ICD-10-CM

## 2024-08-16 RX ORDER — OMEPRAZOLE 40 MG/1
40 CAPSULE, DELAYED RELEASE ORAL DAILY
Qty: 90 CAPSULE | Refills: 1 | Status: SHIPPED | OUTPATIENT
Start: 2024-08-16

## 2024-10-29 ENCOUNTER — APPOINTMENT (OUTPATIENT)
Dept: PRIMARY CARE | Facility: CLINIC | Age: 54
End: 2024-10-29
Payer: COMMERCIAL

## 2024-10-29 VITALS
OXYGEN SATURATION: 97 % | TEMPERATURE: 97.9 F | RESPIRATION RATE: 20 BRPM | HEART RATE: 57 BPM | BODY MASS INDEX: 27.6 KG/M2 | HEIGHT: 75 IN | SYSTOLIC BLOOD PRESSURE: 137 MMHG | WEIGHT: 222 LBS | DIASTOLIC BLOOD PRESSURE: 85 MMHG

## 2024-10-29 DIAGNOSIS — F41.1 GENERALIZED ANXIETY DISORDER: Primary | ICD-10-CM

## 2024-10-29 DIAGNOSIS — E78.5 HYPERLIPIDEMIA, UNSPECIFIED HYPERLIPIDEMIA TYPE: ICD-10-CM

## 2024-10-29 DIAGNOSIS — R00.2 HEART PALPITATIONS: ICD-10-CM

## 2024-10-29 DIAGNOSIS — K21.9 GASTROESOPHAGEAL REFLUX DISEASE WITHOUT ESOPHAGITIS: ICD-10-CM

## 2024-10-29 DIAGNOSIS — I10 HYPERTENSION, ESSENTIAL, BENIGN: ICD-10-CM

## 2024-10-29 PROBLEM — I47.20 VENTRICULAR TACHYCARDIA (MULTI): Status: RESOLVED | Noted: 2023-10-12 | Resolved: 2024-10-29

## 2024-10-29 PROCEDURE — 99214 OFFICE O/P EST MOD 30 MIN: CPT | Performed by: FAMILY MEDICINE

## 2024-10-29 RX ORDER — SERTRALINE HYDROCHLORIDE 50 MG/1
50 TABLET, FILM COATED ORAL NIGHTLY
Qty: 30 TABLET | Refills: 1 | Status: SHIPPED | OUTPATIENT
Start: 2024-10-29 | End: 2024-10-29

## 2024-10-29 RX ORDER — SERTRALINE HYDROCHLORIDE 50 MG/1
50 TABLET, FILM COATED ORAL NIGHTLY
Qty: 30 TABLET | Refills: 1 | Status: SHIPPED | OUTPATIENT
Start: 2024-10-29 | End: 2024-12-28

## 2024-12-02 ENCOUNTER — APPOINTMENT (OUTPATIENT)
Dept: PRIMARY CARE | Facility: CLINIC | Age: 54
End: 2024-12-02
Payer: COMMERCIAL

## 2024-12-02 VITALS
RESPIRATION RATE: 16 BRPM | SYSTOLIC BLOOD PRESSURE: 152 MMHG | BODY MASS INDEX: 28.25 KG/M2 | HEART RATE: 56 BPM | WEIGHT: 226 LBS | OXYGEN SATURATION: 96 % | TEMPERATURE: 97.6 F | DIASTOLIC BLOOD PRESSURE: 88 MMHG

## 2024-12-02 DIAGNOSIS — I10 HYPERTENSION, ESSENTIAL, BENIGN: ICD-10-CM

## 2024-12-02 DIAGNOSIS — F41.1 GENERALIZED ANXIETY DISORDER: Primary | ICD-10-CM

## 2024-12-02 DIAGNOSIS — E78.5 HYPERLIPIDEMIA, UNSPECIFIED HYPERLIPIDEMIA TYPE: ICD-10-CM

## 2024-12-02 PROCEDURE — 99213 OFFICE O/P EST LOW 20 MIN: CPT | Performed by: FAMILY MEDICINE

## 2024-12-02 RX ORDER — SERTRALINE HYDROCHLORIDE 50 MG/1
75 TABLET, FILM COATED ORAL NIGHTLY
Qty: 45 TABLET | Refills: 1 | Status: SHIPPED | OUTPATIENT
Start: 2024-12-02 | End: 2025-01-31

## 2024-12-02 ASSESSMENT — ENCOUNTER SYMPTOMS
DEPRESSION: 0
LOSS OF SENSATION IN FEET: 0
OCCASIONAL FEELINGS OF UNSTEADINESS: 0

## 2024-12-02 ASSESSMENT — PATIENT HEALTH QUESTIONNAIRE - PHQ9
2. FEELING DOWN, DEPRESSED OR HOPELESS: NOT AT ALL
SUM OF ALL RESPONSES TO PHQ9 QUESTIONS 1 AND 2: 0
1. LITTLE INTEREST OR PLEASURE IN DOING THINGS: NOT AT ALL

## 2024-12-02 NOTE — PROGRESS NOTES
"Subjective   Patient ID: Jesus Jackson is a 54 y.o. male who presents for Follow-up (Fu for anxiety and new med Zoloft. The first week did not notice the difference. Has not noticed a drastic difference but more than 50% sure it is working. Has been a little jittery. Sleeping better now. ).    HPI  Patient comes in today for follow-up on his antidepressant/antianxiety medicine.  He feels like he is more than 50% improved.  He notices that he is not as anxious as he was and he does not \"fly off the handle\" as easily as he was doing in the past.    10/29/2024  Patient comes in today for evaluation of anxiety.  Patient has had anxiety most of his life.  He was  recently in to see his cardiologist Dr. Hanks and after discussion with him it was recommended that he follow-up with his PCP for further evaluation.  He states that he is right now in the midst of trying to get a sleep study done for Dr. Hanks and there is an insurance hang up.    Patient given standard psych evaluation forms.  He scored a 26 on the anxiety; a 3 on the depression; a 6 on the bipolar and a 10/10 respectively on the ADD/ADHD form.  We reviewed the results afterward and after lengthy discussion he would like to try medication first.    Review of Systems   All other systems reviewed and are negative.      Objective   Vitals:  /88   Pulse 56   Temp 36.4 °C (97.6 °F)   Resp 16   Wt 103 kg (226 lb)   SpO2 96%   BMI 28.25 kg/m²     Physical Exam  Vitals reviewed.   Constitutional:       Appearance: He is well-developed.      Comments: Wears a beard   HENT:      Head: Normocephalic and atraumatic.      Right Ear: Tympanic membrane, ear canal and external ear normal.      Left Ear: Tympanic membrane, ear canal and external ear normal.      Nose: Nose normal.      Mouth/Throat:      Mouth: Mucous membranes are moist.      Pharynx: Oropharynx is clear.   Eyes:      Extraocular Movements: Extraocular movements intact.      " Conjunctiva/sclera: Conjunctivae normal.      Pupils: Pupils are equal, round, and reactive to light.   Cardiovascular:      Rate and Rhythm: Normal rate and regular rhythm.      Heart sounds: Normal heart sounds. No murmur heard.  Pulmonary:      Effort: Pulmonary effort is normal.      Breath sounds: Normal breath sounds. No wheezing.   Abdominal:      General: Abdomen is flat. Bowel sounds are normal.      Palpations: Abdomen is soft.   Musculoskeletal:         General: Normal range of motion.   Skin:     General: Skin is warm and dry.   Neurological:      General: No focal deficit present.      Mental Status: He is alert and oriented to person, place, and time. Mental status is at baseline.   Psychiatric:         Mood and Affect: Mood normal.         Behavior: Behavior normal.         Thought Content: Thought content normal.         Judgment: Judgment normal.       Assessment/Plan   Problem List Items Addressed This Visit       Generalized anxiety disorder - Primary    Relevant Medications    sertraline (Zoloft) 50 mg tablet    Hyperlipidemia    Hypertension, essential, benign   Take new dose of medication as directed.  Continue other medications as directed.  RTC in one month for recheck, sooner should problems arise.  Medication list reconciled.  Thank you for visiting today!      Professional services: Some of this note was completed using Dragon voice technology and sometimes the software misinterprets words. This may include unintended errors with respect to translation of words, typographical errors or grammar errors which may not have been identified prior to finalization of the chart note. Please take this into account when reading the note. Thank you.       Hadley Burgess DO 12/06/24 7:42 AM

## 2025-01-02 ENCOUNTER — APPOINTMENT (OUTPATIENT)
Dept: PRIMARY CARE | Facility: CLINIC | Age: 55
End: 2025-01-02
Payer: COMMERCIAL

## 2025-01-02 VITALS
HEART RATE: 56 BPM | SYSTOLIC BLOOD PRESSURE: 122 MMHG | BODY MASS INDEX: 28.47 KG/M2 | TEMPERATURE: 98 F | WEIGHT: 229 LBS | RESPIRATION RATE: 20 BRPM | HEIGHT: 75 IN | DIASTOLIC BLOOD PRESSURE: 88 MMHG

## 2025-01-02 DIAGNOSIS — H93.13 TINNITUS OF BOTH EARS: ICD-10-CM

## 2025-01-02 DIAGNOSIS — F41.1 GENERALIZED ANXIETY DISORDER: Primary | ICD-10-CM

## 2025-01-02 PROCEDURE — 99213 OFFICE O/P EST LOW 20 MIN: CPT | Performed by: FAMILY MEDICINE

## 2025-01-02 RX ORDER — SERTRALINE HYDROCHLORIDE 100 MG/1
100 TABLET, FILM COATED ORAL NIGHTLY
Qty: 30 TABLET | Refills: 1 | Status: SHIPPED | OUTPATIENT
Start: 2025-01-02 | End: 2025-03-03

## 2025-01-02 NOTE — PROGRESS NOTES
"Subjective   Patient ID: Jesus Jackson is a 54 y.o. male who presents for Follow-up (1m zoloft follow up. Dose was increased last visit. He feels it works better than when he first started the medication. ).    HPI   Patient returns today for follow-up on his antidepressant medicine.  He feels like it is working a little better than the initial dose.  After discussion we are going to increase it again.    Patient also having issues with ringing in his ears and decreased hearing.  We discussed this at length as he is not interested in getting hearing aids that are several thousand dollars apiece.    12/2/2024  Patient comes in today for follow-up on his antidepressant/antianxiety medicine.  He feels like he is more than 50% improved.  He notices that he is not as anxious as he was and he does not \"fly off the handle\" as easily as he was doing in the past.    10/29/2024  Patient comes in today for evaluation of anxiety.  Patient has had anxiety most of his life.  He was  recently in to see his cardiologist Dr. Hanks and after discussion with him it was recommended that he follow-up with his PCP for further evaluation.  He states that he is right now in the midst of trying to get a sleep study done for Dr. Hanks and there is an insurance hang up.    Patient given standard psych evaluation forms.  He scored a 26 on the anxiety; a 3 on the depression; a 6 on the bipolar and a 10/10 respectively on the ADD/ADHD form.  We reviewed the results afterward and after lengthy discussion he would like to try medication first.    Review of Systems   All other systems reviewed and are negative.      Objective   /88   Pulse 56   Temp 36.7 °C (98 °F)   Resp 20   Ht 1.905 m (6' 3\")   Wt 104 kg (229 lb)   BMI 28.62 kg/m²     Physical Exam  Vitals reviewed.   Constitutional:       Appearance: He is well-developed.      Comments: Wears a beard   HENT:      Head: Normocephalic and atraumatic.      Right Ear: " Tympanic membrane, ear canal and external ear normal.      Left Ear: Tympanic membrane, ear canal and external ear normal.      Nose: Nose normal.      Mouth/Throat:      Mouth: Mucous membranes are moist.      Pharynx: Oropharynx is clear.   Eyes:      Extraocular Movements: Extraocular movements intact.      Conjunctiva/sclera: Conjunctivae normal.      Pupils: Pupils are equal, round, and reactive to light.   Cardiovascular:      Rate and Rhythm: Normal rate and regular rhythm.      Heart sounds: Normal heart sounds. No murmur heard.  Pulmonary:      Effort: Pulmonary effort is normal.      Breath sounds: Normal breath sounds. No wheezing.   Abdominal:      General: Abdomen is flat. Bowel sounds are normal.      Palpations: Abdomen is soft.   Musculoskeletal:         General: Normal range of motion.   Skin:     General: Skin is warm and dry.   Neurological:      General: No focal deficit present.      Mental Status: He is alert and oriented to person, place, and time. Mental status is at baseline.   Psychiatric:         Mood and Affect: Mood normal.         Behavior: Behavior normal.         Thought Content: Thought content normal.         Judgment: Judgment normal.       Assessment/Plan   Problem List Items Addressed This Visit       Generalized anxiety disorder - Primary    Relevant Medications    sertraline (Zoloft) 100 mg tablet    Tinnitus of both ears   Take new dose of medication as directed.  Continue other medications as directed.  RTC in one month for recheck, sooner should problems arise.  Medication list reconciled.  Thank you for visiting today!      Professional services: Some of this note was completed using Dragon voice technology and sometimes the software misinterprets words. This may include unintended errors with respect to translation of words, typographical errors or grammar errors which may not have been identified prior to finalization of the chart note. Please take this into account when  reading the note. Thank you.

## 2025-01-30 ENCOUNTER — APPOINTMENT (OUTPATIENT)
Dept: PRIMARY CARE | Facility: CLINIC | Age: 55
End: 2025-01-30
Payer: COMMERCIAL

## 2025-01-30 VITALS
BODY MASS INDEX: 28.37 KG/M2 | DIASTOLIC BLOOD PRESSURE: 88 MMHG | TEMPERATURE: 98.4 F | RESPIRATION RATE: 16 BRPM | OXYGEN SATURATION: 98 % | WEIGHT: 227 LBS | SYSTOLIC BLOOD PRESSURE: 138 MMHG | HEART RATE: 56 BPM

## 2025-01-30 DIAGNOSIS — I10 HYPERTENSION, ESSENTIAL, BENIGN: ICD-10-CM

## 2025-01-30 DIAGNOSIS — F41.1 GENERALIZED ANXIETY DISORDER: Primary | ICD-10-CM

## 2025-01-30 DIAGNOSIS — E78.2 HYPERLIPIDEMIA, MIXED: ICD-10-CM

## 2025-01-30 DIAGNOSIS — K21.9 GASTROESOPHAGEAL REFLUX DISEASE WITHOUT ESOPHAGITIS: ICD-10-CM

## 2025-01-30 PROCEDURE — 99213 OFFICE O/P EST LOW 20 MIN: CPT | Performed by: FAMILY MEDICINE

## 2025-01-30 RX ORDER — SERTRALINE HYDROCHLORIDE 100 MG/1
100 TABLET, FILM COATED ORAL NIGHTLY
Qty: 90 TABLET | Refills: 1 | Status: SHIPPED | OUTPATIENT
Start: 2025-01-30 | End: 2025-07-29

## 2025-01-30 NOTE — PROGRESS NOTES
Subjective   Patient ID: Jesus Jackson is a 54 y.o. male who presents for Follow-up (Fu for Zoloft. It is probably working but he sometimes feels like he is on benadryl-he is yawning all day and some jitters at random times and in the morning. ).    HPI  Patient comes in today for 1 month follow-up on his antidepressant medicine.  It seems to be working well for him.  He thinks the current dose is good.  He is not having the intrusive thoughts like he did before and is no longer having the emotional outbursts like he did before.  He used to have a problem with crowds such as in restaurants but that seems to be improved now as well.  He states his wife has noticed a difference as well.    Review of Systems   All other systems reviewed and are negative.      Objective   Vitals:  /88   Pulse 56   Temp 36.9 °C (98.4 °F)   Resp 16   Wt 103 kg (227 lb)   SpO2 98%   BMI 28.37 kg/m²     Physical Exam    Assessment/Plan   Problem List Items Addressed This Visit       Gastroesophageal reflux disease    Generalized anxiety disorder - Primary    Relevant Medications    sertraline (Zoloft) 100 mg tablet    Hyperlipidemia, mixed    Hypertension, essential, benign   Return to clinic in 4 months for recheck and physical exam.  Medication list reconciled.  Thank you for visiting today!      Professional services: Some of this note was completed using Dragon voice technology and sometimes the software misinterprets words. This may include unintended errors with respect to translation of words, typographical errors or grammar errors which may not have been identified prior to finalization of the chart note. Please take this into account when reading the note. Thank you.         Hadley Burgess DO 01/30/25 12:50 PM

## 2025-02-12 DIAGNOSIS — K21.9 GERD WITHOUT ESOPHAGITIS: ICD-10-CM

## 2025-02-12 RX ORDER — OMEPRAZOLE 40 MG/1
40 CAPSULE, DELAYED RELEASE ORAL DAILY
Qty: 90 CAPSULE | Refills: 1 | Status: SHIPPED | OUTPATIENT
Start: 2025-02-12

## 2025-02-12 NOTE — TELEPHONE ENCOUNTER
Pharmacy requests prescription below    Last Office Visit: 1/30/2025   Next Office Visit: 5/30/2025     Requested Prescriptions     Pending Prescriptions Disp Refills    omeprazole (PriLOSEC) 40 mg DR capsule [Pharmacy Med Name: OMEPRAZOLE DR CAPS 40MG] 90 capsule 3     Sig: TAKE 1 CAPSULE DAILY

## 2025-04-30 LAB
NON-UH HIE BUN/CREAT RATIO: 12
NON-UH HIE BUN: 12 MG/DL (ref 9–23)
NON-UH HIE CALCIUM: 9.7 MG/DL (ref 8.7–10.4)
NON-UH HIE CALCULATED OSMOLALITY: 286 MOSM/KG (ref 275–295)
NON-UH HIE CHLORIDE: 105 MMOL/L (ref 98–107)
NON-UH HIE CO2, VENOUS: 29 MMOL/L (ref 20–31)
NON-UH HIE CREATININE: 1 MG/DL (ref 0.6–1.1)
NON-UH HIE GFR AA: >60
NON-UH HIE GLOMERULAR FILTRATION RATE: >60 ML/MIN/1.73M?
NON-UH HIE GLUCOSE: 115 MG/DL (ref 74–106)
NON-UH HIE K: 4.2 MMOL/L (ref 3.5–5.1)
NON-UH HIE NA: 143 MMOL/L (ref 135–145)

## 2025-05-30 ENCOUNTER — HOSPITAL ENCOUNTER (OUTPATIENT)
Dept: RADIOLOGY | Facility: CLINIC | Age: 55
Discharge: HOME | End: 2025-05-30
Payer: COMMERCIAL

## 2025-05-30 ENCOUNTER — APPOINTMENT (OUTPATIENT)
Dept: PRIMARY CARE | Facility: CLINIC | Age: 55
End: 2025-05-30
Payer: COMMERCIAL

## 2025-05-30 DIAGNOSIS — G89.29 CHRONIC BILATERAL LOW BACK PAIN WITHOUT SCIATICA: ICD-10-CM

## 2025-05-30 DIAGNOSIS — G89.29 CHRONIC MIDLINE THORACIC BACK PAIN: ICD-10-CM

## 2025-05-30 DIAGNOSIS — E55.9 VITAMIN D DEFICIENCY: ICD-10-CM

## 2025-05-30 DIAGNOSIS — Z00.00 ROUTINE GENERAL MEDICAL EXAMINATION AT A HEALTH CARE FACILITY: Primary | ICD-10-CM

## 2025-05-30 DIAGNOSIS — M54.2 NECK PAIN, CHRONIC: ICD-10-CM

## 2025-05-30 DIAGNOSIS — M54.6 CHRONIC MIDLINE THORACIC BACK PAIN: ICD-10-CM

## 2025-05-30 DIAGNOSIS — R53.83 FATIGUE, UNSPECIFIED TYPE: ICD-10-CM

## 2025-05-30 DIAGNOSIS — G89.29 NECK PAIN, CHRONIC: ICD-10-CM

## 2025-05-30 DIAGNOSIS — E78.2 HYPERLIPIDEMIA, MIXED: ICD-10-CM

## 2025-05-30 DIAGNOSIS — Z13.228 SCREENING FOR METABOLIC DISORDER: ICD-10-CM

## 2025-05-30 DIAGNOSIS — R73.9 HYPERGLYCEMIA: ICD-10-CM

## 2025-05-30 DIAGNOSIS — E53.8 VITAMIN B12 DEFICIENCY: ICD-10-CM

## 2025-05-30 DIAGNOSIS — M54.50 CHRONIC BILATERAL LOW BACK PAIN WITHOUT SCIATICA: ICD-10-CM

## 2025-05-30 DIAGNOSIS — N40.0 BENIGN PROSTATIC HYPERPLASIA, UNSPECIFIED WHETHER LOWER URINARY TRACT SYMPTOMS PRESENT: ICD-10-CM

## 2025-05-30 DIAGNOSIS — Z11.59 ENCOUNTER FOR HEPATITIS C SCREENING TEST FOR LOW RISK PATIENT: ICD-10-CM

## 2025-05-30 PROCEDURE — 72040 X-RAY EXAM NECK SPINE 2-3 VW: CPT

## 2025-05-30 PROCEDURE — 72072 X-RAY EXAM THORAC SPINE 3VWS: CPT

## 2025-05-30 PROCEDURE — 99396 PREV VISIT EST AGE 40-64: CPT | Performed by: FAMILY MEDICINE

## 2025-05-30 PROCEDURE — 72100 X-RAY EXAM L-S SPINE 2/3 VWS: CPT

## 2025-05-30 RX ORDER — LOSARTAN POTASSIUM AND HYDROCHLOROTHIAZIDE 12.5; 5 MG/1; MG/1
TABLET ORAL
COMMUNITY
Start: 2025-04-16

## 2025-05-30 NOTE — PROGRESS NOTES
"Subjective   Patient ID: Jesus Jackson is a 54 y.o. male who presents for Annual Exam.    HPI  Patient comes in today for annual physical exam.  He was recently in to see his cardiologist Dr. Hanks and his blood pressure was elevated and he was started on a \"water pill\".  He is not exactly sure of the name.  Despite his high level here today he states his blood pressures have been much better at home.  This morning it was 128/73.    Patient does have chronic back pain in the mid thoracic and lumbar spine.  He also has chronic neck pain.    Patient's colonoscopy is up-to-date 11/15/2017 kal 10 years.    Patient's PHQ-9 score today is 4 which he describes is not difficult at all.    Review of Systems   All other systems reviewed and are negative.      Objective   Vitals:  /73 Comment: This morning at home on his calibrated home cuff  Pulse 53   Temp 36.9 °C (98.5 °F)   Resp 18   Wt 97.7 kg (215 lb 6.4 oz)   BMI 26.92 kg/m²     Physical Exam  Vitals reviewed.   Constitutional:       Appearance: He is well-developed.      Comments: Wears a beard   HENT:      Head: Normocephalic and atraumatic.      Right Ear: Tympanic membrane, ear canal and external ear normal.      Left Ear: Tympanic membrane, ear canal and external ear normal.      Nose: Nose normal.      Mouth/Throat:      Mouth: Mucous membranes are moist.      Pharynx: Oropharynx is clear.   Eyes:      Extraocular Movements: Extraocular movements intact.      Conjunctiva/sclera: Conjunctivae normal.      Pupils: Pupils are equal, round, and reactive to light.   Cardiovascular:      Rate and Rhythm: Normal rate and regular rhythm.      Heart sounds: Normal heart sounds. No murmur heard.  Pulmonary:      Effort: Pulmonary effort is normal.      Breath sounds: Normal breath sounds. No wheezing.   Abdominal:      General: Abdomen is flat. Bowel sounds are normal.      Palpations: Abdomen is soft.   Musculoskeletal:         General: Tenderness " (Chronic neck, thoracic and lumbar spine pains) present. Normal range of motion.   Skin:     General: Skin is warm and dry.   Neurological:      General: No focal deficit present.      Mental Status: He is alert and oriented to person, place, and time. Mental status is at baseline.   Psychiatric:         Mood and Affect: Mood normal.         Behavior: Behavior normal.         Thought Content: Thought content normal.         Judgment: Judgment normal.       CBC -  Recent Labs     05/30/25  0842 06/07/24  1209 05/02/23  0831   WBC 4.8 4.8 5.1   HGB 15.3 14.5 15.3   HCT 46.0 42.1 44.5    238 201   MCV 88.8 85 86       CMP -  Recent Labs     05/30/25  0842 06/07/24  1209 05/02/23  0831 03/06/19  1028 11/27/18  0959 10/05/18  1522 07/16/18  0951    142 140   < > 142 142  --    K 4.7 5.0 4.6   < > 4.6 4.1 4.3    106 102   < > 103 105  --    CO2 27 27 27   < > 29 27  --    ANIONGAP 8 14 16   < > 15 14  --    BUN 16 16 12   < > 12 10  --    CREATININE 0.89 1.03 0.93   < > 0.94 0.94  --    EGFR 101 86  --   --   --   --   --    MG  --   --   --   --  2.26 2.60* 2.34    < > = values in this interval not displayed.     Recent Labs     05/30/25 0842 06/07/24  1209 05/02/23  0831   ALBUMIN 5.1 4.9 4.9   ALKPHOS 77 83 75   ALT 20 22 26   AST 20 23 24   BILITOT 0.8 0.9 0.8       LIPID PANEL -   Recent Labs     05/30/25  0842 06/07/24  1209 05/02/23  0831 12/08/20  1127 12/05/19  0942   CHOL 173 147 192 155 172   LDLCALC 91 84  --   --   --    LDLF  --   --  90 81 94   HDL 62 49.3 50.3 51.0 49.4   TRIG 108 69 259* 116 143       Recent Labs     05/30/25  0842 05/02/23  0831   HGBA1C 5.7* 5.8*       Lab Results   Component Value Date    YQSSIPRL64 1,172 (H) 05/30/2025       Lab Results   Component Value Date    VITD25 32 05/30/2025        Assessment/Plan   Problem List Items Addressed This Visit       Hyperlipidemia, mixed    Relevant Orders    Lipid Panel (Completed)    Vitamin D deficiency    Relevant Orders     Vitamin D 25-Hydroxy,Total (for eval of Vitamin D levels) (Completed)    Vitamin B12 deficiency - Primary    Relevant Orders    CBC (Completed)    Vitamin B12 (Completed)     Other Visit Diagnoses         Screening for metabolic disorder        Relevant Orders    Comprehensive Metabolic Panel (Completed)      Hyperglycemia        Relevant Orders    Hemoglobin A1C (Completed)      Encounter for hepatitis C screening test for low risk patient        Relevant Orders    Hepatitis C Antibody (Completed)      Fatigue, unspecified type        Relevant Orders    TSH with reflex to Free T4 if abnormal (Completed)      Benign prostatic hyperplasia, unspecified whether lower urinary tract symptoms present        Relevant Orders    Prostate Specific Antigen (Completed)      Neck pain, chronic        Relevant Orders    XR cervical spine 2-3 views (Completed)      Chronic bilateral low back pain without sciatica        Relevant Orders    XR lumbar spine 2-3 views (Completed)      Chronic midline thoracic back pain        Relevant Orders    XR thoracic spine 3 views (Completed)        Will contact patient with x-ray results when they are available.  Will contact patient with lab results when available.  Continue current meds as directed.  Follow up in 6 months for recheck if all remains stable, sooner if problems arise.     Patient patient on chronic depression meds and does not need treatment at this time.  Medication list reconciled.  Thank you for visiting today!        Professional services: Some of this note was completed using Dragon voice technology and sometimes the software misinterprets words. This may include unintended errors with respect to translation of words, typographical errors or grammar errors which may not have been identified prior to finalization of the chart note. Please take this into account when reading the note. Thank you.

## 2025-05-31 LAB
25(OH)D3+25(OH)D2 SERPL-MCNC: 32 NG/ML (ref 30–100)
ALBUMIN SERPL-MCNC: 5.1 G/DL (ref 3.6–5.1)
ALP SERPL-CCNC: 77 U/L (ref 35–144)
ALT SERPL-CCNC: 20 U/L (ref 9–46)
ANION GAP SERPL CALCULATED.4IONS-SCNC: 8 MMOL/L (CALC) (ref 7–17)
AST SERPL-CCNC: 20 U/L (ref 10–35)
BILIRUB SERPL-MCNC: 0.8 MG/DL (ref 0.2–1.2)
BUN SERPL-MCNC: 16 MG/DL (ref 7–25)
CALCIUM SERPL-MCNC: 9.8 MG/DL (ref 8.6–10.3)
CHLORIDE SERPL-SCNC: 104 MMOL/L (ref 98–110)
CHOLEST SERPL-MCNC: 173 MG/DL
CHOLEST/HDLC SERPL: 2.8 (CALC)
CO2 SERPL-SCNC: 27 MMOL/L (ref 20–32)
CREAT SERPL-MCNC: 0.89 MG/DL (ref 0.7–1.3)
EGFRCR SERPLBLD CKD-EPI 2021: 101 ML/MIN/1.73M2
ERYTHROCYTE [DISTWIDTH] IN BLOOD BY AUTOMATED COUNT: 13 % (ref 11–15)
EST. AVERAGE GLUCOSE BLD GHB EST-MCNC: 117 MG/DL
EST. AVERAGE GLUCOSE BLD GHB EST-SCNC: 6.5 MMOL/L
GLUCOSE SERPL-MCNC: 127 MG/DL (ref 65–99)
HBA1C MFR BLD: 5.7 %
HCT VFR BLD AUTO: 46 % (ref 38.5–50)
HCV AB SERPL QL IA: NORMAL
HDLC SERPL-MCNC: 62 MG/DL
HGB BLD-MCNC: 15.3 G/DL (ref 13.2–17.1)
LDLC SERPL CALC-MCNC: 91 MG/DL (CALC)
MCH RBC QN AUTO: 29.5 PG (ref 27–33)
MCHC RBC AUTO-ENTMCNC: 33.3 G/DL (ref 32–36)
MCV RBC AUTO: 88.8 FL (ref 80–100)
NONHDLC SERPL-MCNC: 111 MG/DL (CALC)
PLATELET # BLD AUTO: 200 THOUSAND/UL (ref 140–400)
PMV BLD REES-ECKER: 11.6 FL (ref 7.5–12.5)
POTASSIUM SERPL-SCNC: 4.7 MMOL/L (ref 3.5–5.3)
PROT SERPL-MCNC: 7.6 G/DL (ref 6.1–8.1)
PSA SERPL-MCNC: 0.3 NG/ML
RBC # BLD AUTO: 5.18 MILLION/UL (ref 4.2–5.8)
SODIUM SERPL-SCNC: 139 MMOL/L (ref 135–146)
TRIGL SERPL-MCNC: 108 MG/DL
TSH SERPL-ACNC: 0.87 MIU/L (ref 0.4–4.5)
VIT B12 SERPL-MCNC: 1172 PG/ML (ref 200–1100)
WBC # BLD AUTO: 4.8 THOUSAND/UL (ref 3.8–10.8)

## 2025-06-01 VITALS
HEART RATE: 53 BPM | SYSTOLIC BLOOD PRESSURE: 128 MMHG | TEMPERATURE: 98.5 F | BODY MASS INDEX: 26.92 KG/M2 | WEIGHT: 215.4 LBS | DIASTOLIC BLOOD PRESSURE: 73 MMHG | RESPIRATION RATE: 18 BRPM

## 2025-06-04 NOTE — RESULT ENCOUNTER NOTE
Sukhdev Lee,    Your B12 level is high so you can stop any B12 supplements for now.    Your vitamin D level was low normal at 32.  It should be between 30 and 100 the closer to 50 the better. It is an important vitamin for your heart, lungs, immune system and bones among other things in your body. Would recommend over the counter vitamin D3 2000 units daily to get it into and keep it in the normal range and recheck in October 2025.     All the rest of your labs were good.  Continue current medicines and will recheck again in a year.    Have a Great Day and a Great rest of the Week!!!    Dr. Burgess

## 2025-07-18 DIAGNOSIS — F41.1 GENERALIZED ANXIETY DISORDER: ICD-10-CM

## 2025-07-18 RX ORDER — SERTRALINE HYDROCHLORIDE 100 MG/1
100 TABLET, FILM COATED ORAL EVERY EVENING
Qty: 90 TABLET | Refills: 0 | Status: SHIPPED | OUTPATIENT
Start: 2025-07-18

## 2025-08-11 DIAGNOSIS — K21.9 GERD WITHOUT ESOPHAGITIS: ICD-10-CM

## 2025-08-11 RX ORDER — OMEPRAZOLE 40 MG/1
40 CAPSULE, DELAYED RELEASE ORAL DAILY
Qty: 90 CAPSULE | Refills: 1 | Status: SHIPPED | OUTPATIENT
Start: 2025-08-11